# Patient Record
Sex: FEMALE | Race: WHITE | NOT HISPANIC OR LATINO | Employment: UNEMPLOYED | URBAN - METROPOLITAN AREA
[De-identification: names, ages, dates, MRNs, and addresses within clinical notes are randomized per-mention and may not be internally consistent; named-entity substitution may affect disease eponyms.]

---

## 2024-08-29 ENCOUNTER — INITIAL PRENATAL (OUTPATIENT)
Dept: OBSTETRICS AND GYNECOLOGY | Facility: CLINIC | Age: 29
End: 2024-08-29
Payer: COMMERCIAL

## 2024-08-29 VITALS — BODY MASS INDEX: 20.83 KG/M2 | WEIGHT: 133 LBS | SYSTOLIC BLOOD PRESSURE: 110 MMHG | DIASTOLIC BLOOD PRESSURE: 76 MMHG

## 2024-08-29 DIAGNOSIS — Z34.01 ENCOUNTER FOR SUPERVISION OF NORMAL FIRST PREGNANCY IN FIRST TRIMESTER: Primary | ICD-10-CM

## 2024-08-29 LAB — TSH SERPL DL<=0.005 MIU/L-ACNC: 0.21 UIU/ML (ref 0.27–4.2)

## 2024-08-29 RX ORDER — PRENATAL VIT NO.126/IRON/FOLIC 28MG-0.8MG
TABLET ORAL DAILY
COMMUNITY

## 2024-08-29 NOTE — PROGRESS NOTES
Initial ob visit     CC- Here for care of pregnancy        Debbie Stapleton is a 29 y.o. female, , who presents for her first obstetrical visit.  Patient's last menstrual period was 2024.. Her YENI is 2025, by Ultrasound. Current GA is 8w1d.     Initial positive test date : 2024, UPT        Her periods are irregular. States did not have a period x3.5 years. Periods returned after stepping away from her job.   Prior obstetric issues: none  Patient's past medical history is significant for:  thyroid disease, herniated lumbar disc, hx of cervical disc surgery .  Family history of genetic issues (includes FOB): none  Prior infections concerning in pregnancy (Rash, fever in last 2 weeks): No  Varicella Hx - unknown   Prior testing for Cystic Fibrosis Carrier or Sickle Cell Trait- no  Prepregnancy BMI - Body mass index is 20.83 kg/m².  History of STD: no  Hx of HSV for patient or partner: no  Ultrasound Today: yes    OB History    Para Term  AB Living   1             SAB IAB Ectopic Molar Multiple Live Births                    # Outcome Date GA Lbr Chung/2nd Weight Sex Type Anes PTL Lv   1 Current                Additional Pertinent History   Last Pap : 2024 Result:  abnormal per patient-states hx of colposcopy     Last Completed Pap Smear       This patient has no relevant Health Maintenance data.          History of abnormal Pap smear: yes - colpo last year  Family history of uterine, colon, breast, or ovarian cancer: no  Feelings of Anxiety or Depression: no  Tobacco Usage?: No   Alcohol/Drug Use?: NO  Over the age of 35 at delivery: no  Genetic Screening: desires to discuss in the future    PMH    Current Outpatient Medications:     prenatal vitamin (prenatal, CLASSIC, vitamin) tablet, Take  by mouth Daily., Disp: , Rfl:      Past Medical History:   Diagnosis Date    Abnormal Pap smear of cervix     Hypertension     situational with stress    Lumbar herniated disc     Thyroid  disease     Thyroid disorder         Past Surgical History:   Procedure Laterality Date    CERVICAL DISC ARTHROPLASTY  01/15/2024    COLONOSCOPY      COLPOSCOPY      UPPER GASTROINTESTINAL ENDOSCOPY         Review of Systems   Review of Systems    Patient Reports:  no complaints  Patient Denies:excessive nausea , excessive vomiting, and vaginal bleeding  All systems reviewed and otherwise normal.    I have reviewed and agree with the HPI, ROS, and historical information as entered above. Marc Aguilera MD      /76   Wt 60.3 kg (133 lb)   LMP 06/21/2024   BMI 20.83 kg/m²     The additional following portions of the patient's history were reviewed and updated as appropriate: allergies, current medications, past family history, past medical history, past social history, and past surgical history.    Physical Exam  General:  well developed; well nourished  no acute distress   Chest/Respiratory: No labored breathing, normal respiratory effort, normal appearance, no respiratory noises noted   Heart:  normal rate, regular rhythm,  no murmurs, rubs, or gallops   Thyroid: normal to inspection and palpation   Breasts:  Not performed.   Abdomen: soft, non-tender; no masses  no umbilical or inguinal hernias are present  no hepato-splenomegaly   Pelvis: Uterus:  symmetrically enlarged, consisent with 8 weeks size;        Assessment and Plan    Problem List Items Addressed This Visit    None  Visit Diagnoses       Encounter for supervision of normal first pregnancy in first trimester    -  Primary    Relevant Orders    Obstetric Panel    HIV-1 / O / 2 Ag / Antibody    Urine Culture - Urine, Urine, Clean Catch    TSH    Varicella Zoster Antibody, IgG    Chlamydia trachomatis, Neisseria gonorrhoeae, PCR - Urine, Urine, Clean Catch    Urine Drug Screen - Urine, Clean Catch    [id number] PLUS Core+SCA+ESS - Blood,            Pregnancy at 8w1d  Reviewed routine prenatal care with the office and educational materials  given  Lab(s) Ordered  Discussed options for genetic testing including first trimester nuchal translucency screen, genetic disease carrier testing, quadruple screen, and NIPT  Discontinue the use of all non-medicinal drugs and chemicals  Return in about 1 month (around 9/29/2024).      Marc Aguilera MD  08/29/2024

## 2024-08-30 LAB
ABO GROUP BLD: NORMAL
AMPHETAMINES UR QL SCN: NEGATIVE NG/ML
BARBITURATES UR QL SCN: NEGATIVE NG/ML
BASOPHILS # BLD AUTO: 0 X10E3/UL (ref 0–0.2)
BASOPHILS NFR BLD AUTO: 0 %
BENZODIAZ UR QL SCN: NEGATIVE NG/ML
BLD GP AB SCN SERPL QL: NEGATIVE
BZE UR QL SCN: NEGATIVE NG/ML
CANNABINOIDS UR QL SCN: NEGATIVE NG/ML
CREAT UR-MCNC: 177.2 MG/DL (ref 20–300)
EOSINOPHIL # BLD AUTO: 0 X10E3/UL (ref 0–0.4)
EOSINOPHIL NFR BLD AUTO: 0 %
ERYTHROCYTE [DISTWIDTH] IN BLOOD BY AUTOMATED COUNT: 12 % (ref 11.7–15.4)
HBV SURFACE AG SERPL QL IA: NEGATIVE
HCT VFR BLD AUTO: 43.3 % (ref 34–46.6)
HCV IGG SERPL QL IA: NON REACTIVE
HGB BLD-MCNC: 14 G/DL (ref 11.1–15.9)
HIV 1+2 AB+HIV1 P24 AG SERPL QL IA: NON REACTIVE
IMM GRANULOCYTES # BLD AUTO: 0 X10E3/UL (ref 0–0.1)
IMM GRANULOCYTES NFR BLD AUTO: 0 %
LABORATORY COMMENT REPORT: NORMAL
LYMPHOCYTES # BLD AUTO: 2 X10E3/UL (ref 0.7–3.1)
LYMPHOCYTES NFR BLD AUTO: 21 %
MCH RBC QN AUTO: 30.8 PG (ref 26.6–33)
MCHC RBC AUTO-ENTMCNC: 32.3 G/DL (ref 31.5–35.7)
MCV RBC AUTO: 95 FL (ref 79–97)
METHADONE UR QL SCN: NEGATIVE NG/ML
MONOCYTES # BLD AUTO: 0.6 X10E3/UL (ref 0.1–0.9)
MONOCYTES NFR BLD AUTO: 6 %
NEUTROPHILS # BLD AUTO: 7 X10E3/UL (ref 1.4–7)
NEUTROPHILS NFR BLD AUTO: 73 %
OPIATES UR QL SCN: NEGATIVE NG/ML
OXYCODONE+OXYMORPHONE UR QL SCN: NEGATIVE NG/ML
PCP UR QL: NEGATIVE NG/ML
PH UR: 6.6 [PH] (ref 4.5–8.9)
PLATELET # BLD AUTO: 241 X10E3/UL (ref 150–450)
PROPOXYPH UR QL SCN: NEGATIVE NG/ML
RBC # BLD AUTO: 4.55 X10E6/UL (ref 3.77–5.28)
RH BLD: POSITIVE
RPR SER QL: NON REACTIVE
RUBV IGG SERPL IA-ACNC: 5.16 INDEX
VZV IGG SER IA-ACNC: 1047 INDEX
WBC # BLD AUTO: 9.7 X10E3/UL (ref 3.4–10.8)

## 2024-08-31 LAB
C TRACH RRNA SPEC QL NAA+PROBE: NEGATIVE
N GONORRHOEA RRNA SPEC QL NAA+PROBE: NEGATIVE

## 2024-09-01 LAB
BACTERIA UR CULT: NORMAL
BACTERIA UR CULT: NORMAL

## 2024-09-03 LAB
BACTERIA UR CULT: ABNORMAL
BACTERIA UR CULT: ABNORMAL

## 2024-09-04 ENCOUNTER — TELEPHONE (OUTPATIENT)
Dept: OBSTETRICS AND GYNECOLOGY | Facility: CLINIC | Age: 29
End: 2024-09-04
Payer: COMMERCIAL

## 2024-09-04 NOTE — TELEPHONE ENCOUNTER
Patient of Dr. Aguilera; G1 @ 9w 0d.   Returned patient's call.   She asked if she could have NIPS drawn at another lab when she has other labs done. Informed her it would need to be drawn here in our office. She v/u and agreed.

## 2024-09-04 NOTE — TELEPHONE ENCOUNTER
PT is calling because she has discussed getting the genetic testing with [facility] at her last apt and PT is having her labs drawn at different lab and wants to know if she can get them done at the same time.

## 2024-09-05 ENCOUNTER — TELEPHONE (OUTPATIENT)
Dept: OBSTETRICS AND GYNECOLOGY | Facility: CLINIC | Age: 29
End: 2024-09-05
Payer: COMMERCIAL

## 2024-09-05 ENCOUNTER — OFFICE VISIT (OUTPATIENT)
Dept: ENDOCRINOLOGY | Facility: CLINIC | Age: 29
End: 2024-09-05
Payer: COMMERCIAL

## 2024-09-05 VITALS
BODY MASS INDEX: 21.16 KG/M2 | RESPIRATION RATE: 16 BRPM | SYSTOLIC BLOOD PRESSURE: 118 MMHG | HEART RATE: 83 BPM | HEIGHT: 67 IN | WEIGHT: 134.8 LBS | TEMPERATURE: 97.3 F | OXYGEN SATURATION: 99 % | DIASTOLIC BLOOD PRESSURE: 76 MMHG

## 2024-09-05 DIAGNOSIS — E03.9 HYPOTHYROIDISM (ACQUIRED): Primary | ICD-10-CM

## 2024-09-05 PROCEDURE — 99213 OFFICE O/P EST LOW 20 MIN: CPT | Performed by: INTERNAL MEDICINE

## 2024-09-05 RX ORDER — LEVOTHYROXINE SODIUM 125 UG/1
1 TABLET ORAL DAILY
COMMUNITY
Start: 2024-08-31

## 2024-09-05 NOTE — PROGRESS NOTES
"     Office Note      Date: 2024  Patient Name: Debbie Stapleton  MRN: [id number]  : 1995    Chief Complaint   Patient presents with    Hypothyroidism     6mo follow up        History of Present Illness:   Debbie Stapleton is a 29 y.o. female who presents for Hypothyroidism (6mo follow up )    She was on armour thyroid.  She found out she was pregnant at the end of July.  She was changed to levothyroxine 125mcg qd.  She is taking this correctly.  She isn't taking any interfering meds concurrently.  She hasn't noted any change in the size of her neck.  She denies any compressive sxs.  She denies any sxs of hypo- or hyperthyroidism at this time.    She is about 9 weeks pregnant.  She had labs done last week.  The TSH was 0.213 (LLN 0.27)    Subjective      Review of Systems:   Review of Systems   Constitutional: Negative.    Cardiovascular: Negative.    Gastrointestinal: Negative.    Endocrine: Negative.        The following portions of the patient's history were reviewed and updated as appropriate: allergies, current medications, past family history, past medical history, past social history, past surgical history, and problem list.    Objective     Visit Vitals  /76 (BP Location: Left arm, Patient Position: Sitting, Cuff Size: Adult)   Pulse 83   Temp 97.3 °F (36.3 °C) (Infrared)   Resp 16   Ht 170.2 cm (67.01\")   Wt 61.1 kg (134 lb 12.8 oz)   LMP 2024   SpO2 99%   BMI 21.11 kg/m²       Physical Exam:  Physical Exam  Constitutional:       Appearance: Normal appearance.   Neck:      Thyroid: No thyroid mass, thyromegaly or thyroid tenderness.   Lymphadenopathy:      Cervical: No cervical adenopathy.   Neurological:      Mental Status: She is alert.         Labs:    TSH  No results found for: \"TSHBASE\"     Free T4  Free T4   Date Value Ref Range Status   2022 0.97 0.93 - 1.70 ng/dL Final       T3  T3, Total   Date Value Ref Range Status   2021 234.0 (H) 80.0 - 200.0 ng/dl " "Final         TPO  No results found for: \"THYROIDAB\"    TG AB  No results found for: \"THGAB\"    TG  No results found for: \"THYROGLB\"    CBC w/DIFF  Lab Results   Component Value Date    WBC 9.7 08/29/2024    RBC 4.55 08/29/2024    HGB 14.0 08/29/2024    HCT 43.3 08/29/2024    MCV 95 08/29/2024    MCH 30.8 08/29/2024    MCHC 32.3 08/29/2024    RDW 12.0 08/29/2024    RDWSD 40.6 04/18/2022    MPV 10.8 04/18/2022     08/29/2024    NEUTRORELPCT 73 08/29/2024    LYMPHORELPCT 21 08/29/2024    MONORELPCT 6 08/29/2024    EOSRELPCT 0 08/29/2024    BASORELPCT 0 08/29/2024    NEUTROABS 7.0 08/29/2024    LYMPHSABS 2.0 08/29/2024    MONOSABS 0.6 08/29/2024    EOSABS 0.0 08/29/2024    BASOSABS 0.0 08/29/2024           Assessment / Plan      Assessment & Plan:  Diagnoses and all orders for this visit:    1. Hypothyroidism (acquired) (Primary)  Assessment & Plan:  Continue T4 tx.  Recent TSH was mildly low but this is to be expected in the first trimester.  Continue current T4 dose but get labs done again in about 4 weeks.    Orders:  -     TSH; Future      Current Outpatient Medications   Medication Instructions    levothyroxine (SYNTHROID, LEVOTHROID) 125 MCG tablet 1 tablet, Oral, Daily    Magnesium Gluconate (MAGNESIUM 27 PO) Oral    prenatal vitamin (prenatal, CLASSIC, vitamin) tablet Oral, Daily      Return in about 2 months (around 11/5/2024) for Recheck with TSH.    Electronically signed by: Dez Wellington MD  09/05/2024  "

## 2024-09-05 NOTE — TELEPHONE ENCOUNTER
Patient returned called about lab results.   Informed her of positive urine culture for GBS and Dr. Aguilera's recommendation for treatment.   States she has multiple antibiotic allergies and is only able to take IV Vancomycin.   Informed her that I will ask Dr. Aguilera to review and will call her back with his recommendation. She v/u and agreed.   Discussed with Dr. Aguilera. He stated that he would defer treatment as long as patient remains asymptomatic. He does think it would be a good idea for patient to see an allergist for further evaluation of multiple antibiotic allergies. He would suggest Dr. Charity Hodge. PCP can refer or he will if needed.   Informed patient. She v/u and agreed. She will call if she becomes symptomatic. States she no longer has a PCP but doesn't think she needs a referral to schedule with an allergist. She will contact Dr. Hodge's office and will let us know if she needs a referral.

## 2024-09-05 NOTE — ASSESSMENT & PLAN NOTE
Continue T4 tx.  Recent TSH was mildly low but this is to be expected in the first trimester.  Continue current T4 dose but get labs done again in about 4 weeks.

## 2024-09-06 ENCOUNTER — LAB (OUTPATIENT)
Dept: OBSTETRICS AND GYNECOLOGY | Facility: CLINIC | Age: 29
End: 2024-09-06
Payer: COMMERCIAL

## 2024-09-06 DIAGNOSIS — Z34.90 EARLY STAGE OF PREGNANCY: Primary | ICD-10-CM

## 2024-10-01 ENCOUNTER — ROUTINE PRENATAL (OUTPATIENT)
Dept: OBSTETRICS AND GYNECOLOGY | Facility: CLINIC | Age: 29
End: 2024-10-01
Payer: COMMERCIAL

## 2024-10-01 VITALS — WEIGHT: 137 LBS | SYSTOLIC BLOOD PRESSURE: 110 MMHG | DIASTOLIC BLOOD PRESSURE: 68 MMHG | BODY MASS INDEX: 21.45 KG/M2

## 2024-10-01 DIAGNOSIS — O99.281 THYROID DISEASE DURING PREGNANCY IN FIRST TRIMESTER: ICD-10-CM

## 2024-10-01 DIAGNOSIS — E07.9 THYROID DISEASE DURING PREGNANCY IN FIRST TRIMESTER: ICD-10-CM

## 2024-10-01 DIAGNOSIS — Z34.01 PRENATAL CARE, FIRST PREGNANCY, FIRST TRIMESTER: Primary | ICD-10-CM

## 2024-10-01 LAB
GLUCOSE UR STRIP-MCNC: NEGATIVE MG/DL
PROT UR STRIP-MCNC: NEGATIVE MG/DL

## 2024-10-01 PROCEDURE — 0502F SUBSEQUENT PRENATAL CARE: CPT | Performed by: OBSTETRICS & GYNECOLOGY

## 2024-10-01 NOTE — PROGRESS NOTES
OB FOLLOW UP  CC- Here for care of pregnancy        Debbie Stapleton is a 29 y.o.  12w6d patient being seen today for her obstetrical follow up visit. Patient reports no complaints. Patient with + GBS at NOB urine-patient with multiple drug allergies. Discussed plan to treat at time of delivery as patient is asymptomatic at this time. Patient sees Dr. Wellington endocrinology-needs TSH drawn today.     Her prenatal care is complicated by (and status) :   Patient Active Problem List   Diagnosis    Hypothyroidism (acquired)    Palpitations       Genetic testing?: already completed and was normal.  NOB labs reviewed  Ultrasound Today: No    ROS -   Patient Denies: leaking of fluid, vaginal bleeding, and excessive vomiting  All other systems reviewed and are negative.     The additional following portions of the patient's history were reviewed and updated as appropriate: allergies and current medications.    I have reviewed and agree with the HPI, ROS, and historical information as entered above. Marc Aguilera MD          /68   Wt 62.1 kg (137 lb)   LMP 2024   BMI 21.45 kg/m²         EXAM:     Prenatal Vitals  BP: 110/68  Weight: 62.1 kg (137 lb)   Fetal Heart Rate: 156          Urine Glucose Read-only: Negative  Urine Protein Read-only: Negative       Assessment and Plan    Problem List Items Addressed This Visit    None  Visit Diagnoses       Prenatal care, first pregnancy, first trimester    -  Primary    Relevant Orders    POC Urinalysis Dipstick (Completed)    Thyroid disease during pregnancy in first trimester                Pregnancy at 12w6d  Labs reviewed from New OB Visit.  Counseled on genetic testing, carrier status and option for NT screen  Activity and Exercise discussed.  Patient is on Prenatal vitamins  Return in about 1 month (around 2024) for Recheck.    Marc Aguilera MD  10/01/2024

## 2024-10-02 LAB — TSH SERPL DL<=0.005 MIU/L-ACNC: 0.14 UIU/ML (ref 0.45–4.5)

## 2024-10-02 RX ORDER — LEVOTHYROXINE SODIUM 112 UG/1
112 TABLET ORAL DAILY
Qty: 90 TABLET | Refills: 1 | Status: SHIPPED | OUTPATIENT
Start: 2024-10-02

## 2024-10-31 ENCOUNTER — TELEPHONE (OUTPATIENT)
Dept: OBSTETRICS AND GYNECOLOGY | Facility: CLINIC | Age: 29
End: 2024-10-31
Payer: COMMERCIAL

## 2024-10-31 NOTE — TELEPHONE ENCOUNTER
Patient of Dr. Aguilera; G1 @ 17w 1d. LOV 10/01/24.   Returned patient's call.   Discussed the importance of regular prenatal visits. Rescheduled appointment from 11/05/24 to tomorrow. She v/u and agreed.

## 2024-10-31 NOTE — TELEPHONE ENCOUNTER
Pt called and stated she was starting a new job and asked if it would be possible to moved her appointment on 11/5 out to her 20 th week and just do her anatomy scan and everything in 1 visit. 1 told pt I would send message back to see what Dr. Aguilera would prefer.

## 2024-11-01 ENCOUNTER — ROUTINE PRENATAL (OUTPATIENT)
Dept: OBSTETRICS AND GYNECOLOGY | Facility: CLINIC | Age: 29
End: 2024-11-01
Payer: COMMERCIAL

## 2024-11-01 DIAGNOSIS — Z34.90 PRENATAL CARE, ANTEPARTUM: Primary | ICD-10-CM

## 2024-11-01 DIAGNOSIS — Z34.02 ENCOUNTER FOR SUPERVISION OF NORMAL FIRST PREGNANCY IN SECOND TRIMESTER: ICD-10-CM

## 2024-11-01 NOTE — PROGRESS NOTES
OB FOLLOW UP  CC- Here for care of pregnancy        Debbie Stapleton is a 29 y.o.  17w2d patient being seen today for her obstetrical follow up visit. Patient reports no complaints    Her prenatal care is complicated by (and status) : see below.  Patient Active Problem List   Diagnosis    Hypothyroidism (acquired)    Palpitations       Flu Status:   Ultrasound Today: No    AFP: declines    ROS -   Patient Denies: leaking of fluid, vaginal bleeding, dysuria, excessive vomiting, and more than 6 contractions per hour  All other systems reviewed and are negative.       The additional following portions of the patient's history were reviewed and updated as appropriate: allergies, current medications, past family history, past medical history, past social history, past surgical history, and problem list.      I have reviewed and agree with the HPI, ROS, and historical information as entered above. Marc Aguilera MD          EXAM:         Fetal Heart Rate: 150                     Assessment and Plan    Problem List Items Addressed This Visit    None  Visit Diagnoses       Prenatal care, antepartum    -  Primary    Relevant Orders    POC Urinalysis Dipstick    Encounter for supervision of normal first pregnancy in second trimester                Pregnancy at 17w2d  Fetal status reassuring.   Counseled on MSAFP alone in relation to OTD and placental issues.    Anatomy scan next visit.   Activity and Exercise discussed.  U/S ordered at follow up  Patient is on Prenatal vitamins  Return in about 3 weeks (around 2024).    Marc Aguilera MD  2024

## 2024-11-08 ENCOUNTER — ROUTINE PRENATAL (OUTPATIENT)
Dept: OBSTETRICS AND GYNECOLOGY | Facility: CLINIC | Age: 29
End: 2024-11-08
Payer: COMMERCIAL

## 2024-11-08 VITALS — DIASTOLIC BLOOD PRESSURE: 70 MMHG | SYSTOLIC BLOOD PRESSURE: 112 MMHG | BODY MASS INDEX: 22.7 KG/M2 | WEIGHT: 145 LBS

## 2024-11-08 DIAGNOSIS — Z34.90 PRENATAL CARE, ANTEPARTUM: Primary | ICD-10-CM

## 2024-11-08 DIAGNOSIS — Z34.02 FIRST PREGNANCY, SECOND TRIMESTER: ICD-10-CM

## 2024-11-08 LAB
GLUCOSE UR STRIP-MCNC: NEGATIVE MG/DL
PROT UR STRIP-MCNC: NEGATIVE MG/DL

## 2024-11-08 RX ORDER — LEVOTHYROXINE SODIUM 125 UG/1
125 TABLET ORAL DAILY
COMMUNITY
Start: 2024-10-26 | End: 2024-11-11

## 2024-11-08 NOTE — PROGRESS NOTES
OB FOLLOW UP  CC- Here for care of pregnancy        Debbie Stapleton is a 29 y.o.  18w2d patient being seen today for her obstetrical follow up visit. Patient reports pt reports sacral pain that is severe and takes breath away. She reports pain decreases with core exercises. She reports will get pain with changing from sitting to standing position. She reports random heart palpitations that occur with  no exertion.     Her prenatal care is complicated by (and status) : see below.  Patient Active Problem List   Diagnosis    Hypothyroidism (acquired)    Palpitations         Ultrasound Today: No    AFP: declines    ROS -   Patient Denies: leaking of fluid, vaginal bleeding, dysuria, excessive vomiting, and more than 6 contractions per hour  All other systems reviewed and are negative.       The additional following portions of the patient's history were reviewed and updated as appropriate: allergies, current medications, and problem list.      I have reviewed and agree with the HPI, ROS, and historical information as entered above. Marc Aguilera MD          EXAM:     Prenatal Vitals  BP: 112/70  Weight: 65.8 kg (145 lb)   Fetal Heart Rate: 140         Urine Glucose Read-only: Negative  Urine Protein Read-only: Negative           Assessment and Plan    Problem List Items Addressed This Visit    None  Visit Diagnoses       Prenatal care, antepartum    -  Primary    Relevant Orders    POC Urinalysis Dipstick (Completed)    First pregnancy, second trimester        Relevant Orders    POC Urinalysis Dipstick (Completed)    US Ob 14 + Weeks Single or First Gestation            Pregnancy at 18w2d  Fetal status reassuring.   Counseled on MSAFP alone in relation to OTD and placental issues.    Anatomy scan next visit.   Activity and Exercise discussed.  U/S ordered at follow up  Patient is on Prenatal vitamins  Return in about 2 weeks (around 2024) for us .    Marc Aguilera MD  2024

## 2024-11-11 ENCOUNTER — OFFICE VISIT (OUTPATIENT)
Dept: ENDOCRINOLOGY | Facility: CLINIC | Age: 29
End: 2024-11-11
Payer: COMMERCIAL

## 2024-11-11 VITALS
WEIGHT: 147 LBS | DIASTOLIC BLOOD PRESSURE: 64 MMHG | HEIGHT: 67 IN | HEART RATE: 73 BPM | BODY MASS INDEX: 23.07 KG/M2 | OXYGEN SATURATION: 99 % | SYSTOLIC BLOOD PRESSURE: 108 MMHG

## 2024-11-11 DIAGNOSIS — E03.9 HYPOTHYROIDISM (ACQUIRED): Primary | ICD-10-CM

## 2024-11-11 LAB — TSH SERPL DL<=0.05 MIU/L-ACNC: 0.79 UIU/ML (ref 0.27–4.2)

## 2024-11-11 PROCEDURE — 99213 OFFICE O/P EST LOW 20 MIN: CPT | Performed by: INTERNAL MEDICINE

## 2024-11-11 PROCEDURE — 36415 COLL VENOUS BLD VENIPUNCTURE: CPT | Performed by: INTERNAL MEDICINE

## 2024-11-11 PROCEDURE — 84443 ASSAY THYROID STIM HORMONE: CPT | Performed by: INTERNAL MEDICINE

## 2024-11-11 RX ORDER — LEVOTHYROXINE SODIUM 112 UG/1
112 TABLET ORAL DAILY
Start: 2024-11-11

## 2024-11-11 NOTE — PROGRESS NOTES
"     Office Note      Date: 2024  Patient Name: Debbie Stapleton  MRN: [id number]  : 1995    Chief Complaint   Patient presents with    Hypothyroidism       History of Present Illness:   Debbie Stapleton is a 29 y.o. female who presents for Hypothyroidism    She was on armour thyroid. She found out she was pregnant at the end of July. She was changed to levothyroxine 125mcg qd.  Her dose was decreased to 112mcg qd about a month ago. She is taking this correctly. She isn't taking any interfering meds concurrently. She hasn't noted any change in the size of her neck. She denies any compressive sxs. She denies any sxs of hypo- or hyperthyroidism at this time.     She is about 18 weeks into the pregnancy.  It seems to be progressing well.    Subjective      Review of Systems:   Review of Systems   Constitutional: Negative.    Cardiovascular: Negative.    Gastrointestinal: Negative.    Endocrine: Negative.        The following portions of the patient's history were reviewed and updated as appropriate: allergies, current medications, past family history, past medical history, past social history, past surgical history, and problem list.    Objective     Visit Vitals  /64 (BP Location: Left arm, Patient Position: Sitting, Cuff Size: Adult)   Pulse 73   Ht 170.2 cm (67\")   Wt 66.7 kg (147 lb)   LMP 2024   SpO2 99%   BMI 23.02 kg/m²       Physical Exam:  Physical Exam  Constitutional:       Appearance: Normal appearance.   Neck:      Thyroid: No thyroid mass, thyromegaly or thyroid tenderness.   Lymphadenopathy:      Cervical: No cervical adenopathy.   Neurological:      Mental Status: She is alert.         Labs:    TSH  No results found for: \"TSHBASE\"     Free T4  Free T4   Date Value Ref Range Status   2022 0.97 0.93 - 1.70 ng/dL Final       T3  T3, Total   Date Value Ref Range Status   2021 234.0 (H) 80.0 - 200.0 ng/dl Final         TPO  No results found for: \"THYROIDAB\"    TG " "AB  No results found for: \"THGAB\"    TG  No results found for: \"THYROGLB\"    CBC w/DIFF  Lab Results   Component Value Date    WBC 9.7 08/29/2024    RBC 4.55 08/29/2024    HGB 14.0 08/29/2024    HCT 43.3 08/29/2024    MCV 95 08/29/2024    MCH 30.8 08/29/2024    MCHC 32.3 08/29/2024    RDW 12.0 08/29/2024    RDWSD 40.6 04/18/2022    MPV 10.8 04/18/2022     08/29/2024    NEUTRORELPCT 73 08/29/2024    LYMPHORELPCT 21 08/29/2024    MONORELPCT 6 08/29/2024    EOSRELPCT 0 08/29/2024    BASORELPCT 0 08/29/2024    NEUTROABS 7.0 08/29/2024    LYMPHSABS 2.0 08/29/2024    MONOSABS 0.6 08/29/2024    EOSABS 0.0 08/29/2024    BASOSABS 0.0 08/29/2024           Assessment / Plan      Assessment & Plan:  Diagnoses and all orders for this visit:    1. Hypothyroidism (acquired) (Primary)  Assessment & Plan:  Continue T4 tx.  Check TSH today.    Will send note about results.    Orders:  -     TSH; Future    Other orders  -     levothyroxine (SYNTHROID, LEVOTHROID) 112 MCG tablet; Take 1 tablet by mouth Daily.      Current Outpatient Medications   Medication Instructions    levothyroxine (SYNTHROID, LEVOTHROID) 112 mcg, Oral, Daily    Magnesium Gluconate (MAGNESIUM 27 PO) Take  by mouth.    prenatal vitamin (prenatal, CLASSIC, vitamin) tablet Daily      Return in about 2 months (around 1/11/2025) for Recheck with TSH.    Electronically signed by: Dez Wellington MD  11/11/2024  "

## 2024-11-22 ENCOUNTER — ROUTINE PRENATAL (OUTPATIENT)
Dept: OBSTETRICS AND GYNECOLOGY | Facility: CLINIC | Age: 29
End: 2024-11-22
Payer: COMMERCIAL

## 2024-11-22 VITALS — DIASTOLIC BLOOD PRESSURE: 66 MMHG | SYSTOLIC BLOOD PRESSURE: 128 MMHG | WEIGHT: 147 LBS | BODY MASS INDEX: 23.02 KG/M2

## 2024-11-22 DIAGNOSIS — Z34.90 PRENATAL CARE, ANTEPARTUM: Primary | ICD-10-CM

## 2024-11-22 DIAGNOSIS — Z34.92 PRENATAL CARE IN SECOND TRIMESTER: ICD-10-CM

## 2024-11-22 LAB
GLUCOSE UR STRIP-MCNC: NEGATIVE MG/DL
PROT UR STRIP-MCNC: NEGATIVE MG/DL

## 2024-11-22 NOTE — PROGRESS NOTES
OB FOLLOW UP  CC- Here for care of pregnancy        Debbie Stapleton is a 29 y.o.  20w2d patient being seen today for her obstetrical follow up visit. Patient reports no complaints.     Her prenatal care is complicated by (and status) :   Patient Active Problem List   Diagnosis    Hypothyroidism (acquired)    Palpitations       Flu Status: Declines  Ultrasound Today: Yes, anatomy scan  AFP was declined.    ROS -     Patient Denies: leaking of fluid, vaginal bleeding, dysuria, excessive vomiting, and more than 6 contractions per hour  Fetal Movement : Yes  All other systems reviewed and are negative.       The additional following portions of the patient's history were reviewed and updated as appropriate: allergies and current medications.      I have reviewed and agree with the HPI, ROS, and historical information as entered above. Marc Aguilera MD      /66   Wt 66.7 kg (147 lb)   LMP 2024   BMI 23.02 kg/m²       EXAM:     Prenatal Vitals  BP: 128/66  Weight: 66.7 kg (147 lb)   Fetal Heart Rate: 140          Urine Glucose Read-only: Negative  Urine Protein Read-only: Negative       Assessment and Plan    Problem List Items Addressed This Visit    None  Visit Diagnoses       Prenatal care, antepartum    -  Primary    Prenatal care in second trimester        Relevant Orders    POC Urinalysis Dipstick (Completed)            Pregnancy at 20w2d  Anatomy scan today is complete and appear within normal limits.  Fetal status reassuring.   Activity and Exercise discussed.  Patient is on Prenatal vitamins  Return in about 1 month (around 2024).      Marc Aguilera MD  2024

## 2024-12-19 ENCOUNTER — ROUTINE PRENATAL (OUTPATIENT)
Dept: OBSTETRICS AND GYNECOLOGY | Facility: CLINIC | Age: 29
End: 2024-12-19
Payer: COMMERCIAL

## 2024-12-19 VITALS — BODY MASS INDEX: 24.59 KG/M2 | SYSTOLIC BLOOD PRESSURE: 118 MMHG | WEIGHT: 157 LBS | DIASTOLIC BLOOD PRESSURE: 70 MMHG

## 2024-12-19 DIAGNOSIS — Z34.03 ENCOUNTER FOR SUPERVISION OF NORMAL FIRST PREGNANCY IN THIRD TRIMESTER: Primary | ICD-10-CM

## 2024-12-19 NOTE — PROGRESS NOTES
OB FOLLOW UP  CC- Here for care of pregnancy        Debbie Stapleton is a 29 y.o.  24w1d patient being seen today for her obstetrical follow up visit. Patient reports occasional nausea and heartburn.     Her prenatal care is complicated by (and status) : see below.  Patient Active Problem List   Diagnosis    Hypothyroidism (acquired)    Palpitations       Flu Status: Declines  Ultrasound Today: No  Reviewed 1 hr glucose testing and TDAP next visit.    ROS -   Patient Denies: leaking of fluid, vaginal bleeding, dysuria, excessive vomiting, and more than 6 contractions per hour  Fetal Movement : normal  All other systems reviewed and are negative.       The additional following portions of the patient's history were reviewed and updated as appropriate: allergies and current medications.      I have reviewed and agree with the HPI, ROS, and historical information as entered above. Marc Aguilera MD      /70   Wt 71.2 kg (157 lb)   LMP 2024   BMI 24.59 kg/m²       EXAM:     Prenatal Vitals  BP: 118/70  Weight: 71.2 kg (157 lb)   Fetal Heart Rate: 147                       Assessment and Plan    Problem List Items Addressed This Visit    None  Visit Diagnoses       Encounter for supervision of normal first pregnancy in third trimester    -  Primary    Relevant Orders    Antibody Screen    CBC (No Diff)    Gestational Screen 1 Hr ([facility])    RPR Qualitative with Reflex to Quant            Pregnancy at 24w1d  Fetal status reassuring.  No US indicated today.  1 hour gtt, CBC, Antibody screen, TDAP, and RPR next visit. Instructions given  Discussed/encouraged TDAP vaccination after 28 weeks  Activity and Exercise discussed.  No follow-ups on file.      Marc Aguilera MD  2024

## 2025-01-17 ENCOUNTER — ROUTINE PRENATAL (OUTPATIENT)
Dept: OBSTETRICS AND GYNECOLOGY | Facility: CLINIC | Age: 30
End: 2025-01-17
Payer: COMMERCIAL

## 2025-01-17 VITALS — SYSTOLIC BLOOD PRESSURE: 128 MMHG | DIASTOLIC BLOOD PRESSURE: 84 MMHG | WEIGHT: 163 LBS | BODY MASS INDEX: 25.53 KG/M2

## 2025-01-17 DIAGNOSIS — Z34.03 ENCOUNTER FOR SUPERVISION OF NORMAL FIRST PREGNANCY IN THIRD TRIMESTER: Primary | ICD-10-CM

## 2025-01-17 NOTE — PROGRESS NOTES
OB FOLLOW UP  CC- Here for care of pregnancy        Debbie Stapleton is a 29 y.o.  28w2d patient being seen today for her obstetrical follow up. Patient reports heartburn alleviated with tums, and non- pitting edema.     Patient undergoing Glucola testing today. She is due for her testing at 4:50pm .       MBT: A+  Rhogam: is not indicated.  28 week packet: reviewed with patient , counseled on fetal movement , pediatrician list reviewed, breast pump discussed, and childbirth classes reviewed  TDAP:  undecided  Flu Status: Declines  Ultrasound Today: No    Her prenatal care is complicated by (and status) : see below.  Patient Active Problem List   Diagnosis    Hypothyroidism (acquired)    Palpitations         ROS -   Patient Denies: Loss of Fluid, Vaginal Spotting, Vision Changes, Headaches, Nausea , Vomiting , Contractions, and skin itching  Fetal Movement : normal    The additional following portions of the patient's history were reviewed and updated as appropriate: allergies, current medications, past family history, past medical history, past social history, past surgical history, and problem list.    I have reviewed and agree with the HPI, ROS, and historical information as entered above. Marc Aguilera MD      /84   Wt 73.9 kg (163 lb)   LMP 2024   BMI 25.53 kg/m²         EXAM:     Prenatal Vitals  BP: 128/84  Weight: 73.9 kg (163 lb)   Fetal Heart Rate: 145                         Assessment and Plan    Problem List Items Addressed This Visit    None  Visit Diagnoses       Encounter for supervision of normal first pregnancy in third trimester    -  Primary            Pregnancy at 28w2d  1 hr Glucola, CBC, RPR. Antibody screen  Fetal movement/PTL or Labor precautions  Patient is on Prenatal vitamins  Some swelling   Activity and Exercise discussed.  No follow-ups on file.        Marc Aguilera MD  2025

## 2025-01-18 LAB
BLD GP AB SCN SERPL QL: NEGATIVE
ERYTHROCYTE [DISTWIDTH] IN BLOOD BY AUTOMATED COUNT: 12.5 % (ref 11.7–15.4)
GLUCOSE 1H P 50 G GLC PO SERPL-MCNC: 148 MG/DL (ref 70–139)
HCT VFR BLD AUTO: 39.7 % (ref 34–46.6)
HGB BLD-MCNC: 12.8 G/DL (ref 11.1–15.9)
MCH RBC QN AUTO: 31.5 PG (ref 26.6–33)
MCHC RBC AUTO-ENTMCNC: 32.2 G/DL (ref 31.5–35.7)
MCV RBC AUTO: 98 FL (ref 79–97)
PLATELET # BLD AUTO: 218 X10E3/UL (ref 150–450)
RBC # BLD AUTO: 4.06 X10E6/UL (ref 3.77–5.28)
RPR SER QL: NON REACTIVE
TSH SERPL DL<=0.005 MIU/L-ACNC: 0.49 UIU/ML (ref 0.45–4.5)
WBC # BLD AUTO: 16.3 X10E3/UL (ref 3.4–10.8)

## 2025-01-21 DIAGNOSIS — R73.09 ABNORMAL GLUCOSE: Primary | ICD-10-CM

## 2025-01-21 NOTE — PROGRESS NOTES
Order 3hr gtt for failed 1 hour. Also ordered urinalysis due to patient reporting urinary retention.

## 2025-01-23 ENCOUNTER — OFFICE VISIT (OUTPATIENT)
Dept: ENDOCRINOLOGY | Facility: CLINIC | Age: 30
End: 2025-01-23
Payer: COMMERCIAL

## 2025-01-23 VITALS
DIASTOLIC BLOOD PRESSURE: 78 MMHG | OXYGEN SATURATION: 98 % | HEIGHT: 67 IN | SYSTOLIC BLOOD PRESSURE: 130 MMHG | WEIGHT: 165 LBS | HEART RATE: 81 BPM | BODY MASS INDEX: 25.9 KG/M2

## 2025-01-23 DIAGNOSIS — E03.9 HYPOTHYROIDISM (ACQUIRED): Primary | ICD-10-CM

## 2025-01-23 PROCEDURE — 99213 OFFICE O/P EST LOW 20 MIN: CPT | Performed by: INTERNAL MEDICINE

## 2025-01-23 NOTE — ASSESSMENT & PLAN NOTE
Recent TSH at goal at 0.491.  Continue current T4 dose.  Plan to recheck TSH about a month after delivery.

## 2025-01-23 NOTE — PROGRESS NOTES
"     Office Note      Date: 2025  Patient Name: Debbie Stapleton  MRN: [id number]  : 1995    Chief Complaint   Patient presents with    Hypothyroidism       History of Present Illness:   Debbie Stapleton is a 29 y.o. female who presents for Hypothyroidism    She was on armour thyroid. She found out she was pregnant at the end of July. She was changed to levothyroxine 125mcg qd.  Her dose was decreased to 112mcg qd. She is taking this correctly. She isn't taking any interfering meds concurrently. She hasn't noted any change in the size of her neck. She denies any compressive sxs. She denies any sxs of hypo- or hyperthyroidism at this time.      She is about 29 weeks into the pregnancy.  It seems to be progressing well.  She is scheduled for OGTT tomorrow.    Subjective      Review of Systems:   Review of Systems   Constitutional: Negative.    Cardiovascular: Negative.    Gastrointestinal: Negative.    Endocrine: Negative.        The following portions of the patient's history were reviewed and updated as appropriate: allergies, current medications, past family history, past medical history, past social history, past surgical history, and problem list.    Objective     Visit Vitals  /78 (BP Location: Right arm, Patient Position: Sitting, Cuff Size: Adult)   Pulse 81   Ht 170.2 cm (67.01\")   Wt 74.8 kg (165 lb)   LMP 2024   SpO2 98%   BMI 25.84 kg/m²       Physical Exam:  Physical Exam  Constitutional:       Appearance: Normal appearance.   Neck:      Thyroid: No thyroid mass, thyromegaly or thyroid tenderness.   Lymphadenopathy:      Cervical: No cervical adenopathy.   Neurological:      Mental Status: She is alert.         Labs:    TSH  No results found for: \"TSHBASE\"     Free T4  Free T4   Date Value Ref Range Status   2022 0.97 0.93 - 1.70 ng/dL Final       T3  T3, Total   Date Value Ref Range Status   2021 234.0 (H) 80.0 - 200.0 ng/dl Final         TPO  No results " "found for: \"THYROIDAB\"    TG AB  No results found for: \"THGAB\"    TG  No results found for: \"THYROGLB\"    CBC w/DIFF  Lab Results   Component Value Date    WBC 16.3 (H) 01/17/2025    RBC 4.06 01/17/2025    HGB 12.8 01/17/2025    HCT 39.7 01/17/2025    MCV 98 (H) 01/17/2025    MCH 31.5 01/17/2025    MCHC 32.2 01/17/2025    RDW 12.5 01/17/2025    RDWSD 40.6 04/18/2022    MPV 10.8 04/18/2022     01/17/2025    NEUTRORELPCT 73 08/29/2024    LYMPHORELPCT 21 08/29/2024    MONORELPCT 6 08/29/2024    EOSRELPCT 0 08/29/2024    BASORELPCT 0 08/29/2024    NEUTROABS 7.0 08/29/2024    LYMPHSABS 2.0 08/29/2024    MONOSABS 0.6 08/29/2024    EOSABS 0.0 08/29/2024    BASOSABS 0.0 08/29/2024           Assessment / Plan      Assessment & Plan:  Diagnoses and all orders for this visit:    1. Hypothyroidism (acquired) (Primary)  Assessment & Plan:  Recent TSH at goal at 0.491.  Continue current T4 dose.  Plan to recheck TSH about a month after delivery.    Orders:  -     TSH; Future      Current Outpatient Medications   Medication Instructions    levothyroxine (SYNTHROID, LEVOTHROID) 112 mcg, Oral, Daily    Magnesium Gluconate (MAGNESIUM 27 PO) Take  by mouth.    prenatal vitamin (prenatal, CLASSIC, vitamin) tablet Daily      Return in about 6 months (around 7/23/2025) for Recheck with TSH.    Electronically signed by: Dez Wellington MD  01/23/2025  "

## 2025-01-24 ENCOUNTER — LAB (OUTPATIENT)
Dept: OBSTETRICS AND GYNECOLOGY | Facility: CLINIC | Age: 30
End: 2025-01-24
Payer: COMMERCIAL

## 2025-01-24 DIAGNOSIS — O99.810 ABNORMAL GLUCOSE TOLERANCE TEST (GTT) DURING PREGNANCY, ANTEPARTUM: Primary | ICD-10-CM

## 2025-01-24 LAB
GLUCOSE 1H P 100 G GLC PO SERPL-MCNC: 128 MG/DL (ref 65–179)
GLUCOSE 2H P 100 G GLC PO SERPL-MCNC: 114 MG/DL (ref 65–154)
GLUCOSE 3H P 100 G GLC PO SERPL-MCNC: 62 MG/DL (ref 65–139)
GLUCOSE P FAST SERPL-MCNC: 159 MG/DL (ref 65–94)

## 2025-01-28 ENCOUNTER — TELEPHONE (OUTPATIENT)
Dept: OBSTETRICS AND GYNECOLOGY | Facility: CLINIC | Age: 30
End: 2025-01-28
Payer: COMMERCIAL

## 2025-01-28 DIAGNOSIS — O99.810 ABNORMAL GLUCOSE TOLERANCE TEST (GTT) DURING PREGNANCY, ANTEPARTUM: Primary | ICD-10-CM

## 2025-01-28 NOTE — TELEPHONE ENCOUNTER
Returned patient's call.   Informed her that Dr. Aguilera has not reviewed 3 hour OGTT results yet. Someone will contact her with his recommendations/comments when he does.  She states that the person that called her with results of her 1 hour glucose screen told her she needed a urine culture because her WBC count was elevated. She left a specimen when she came in for 3 hour OGTT but does not seen any urine culture results.   Informed her that Dr. Aguilera's result note for previous labs did not mention any recommendation for doing a urine culture and one has not been ordered.   She denies any urinary frequency or dysuria. States she does have intermittent pedal edema that resolves with rest.   Discussed hydrating well; wearing compression hose; using pregnancy support belt; and s/s of preeclampsia. Advised to call for any worsening symptoms. Will ask Dr. Aguilera to review lab results and someone will contact her with his comments/recommendations. She v/u and agreed.

## 2025-01-28 NOTE — TELEPHONE ENCOUNTER
Patient of Dr. Aguilera; G1 @ 29w 6d. LOV 01/17/25; NOV 02/13/25.  Attempted to return patient's call. Left voice message to call us back.

## 2025-01-28 NOTE — TELEPHONE ENCOUNTER
Discussed lab results with Dr. Aguilera.  No need for urine culture.   He recommends patient begin testing glucose at home fasting and 2 hours after meals. Will refer to Diabetes Education and send Rx for testing supplies. Patient sees endocrinology for thyroid disorder; can also discuss with him.   Attempted to reach patient; left voice message to call us back. Need to confirm her preferred pharmacy.

## 2025-01-29 RX ORDER — LANCETS 23 GAUGE
EACH MISCELLANEOUS
Qty: 200 EACH | Refills: 1 | Status: SHIPPED | OUTPATIENT
Start: 2025-01-29

## 2025-01-29 NOTE — TELEPHONE ENCOUNTER
Called patient.   Reviewed Dr. Aguilera's recommendations. Informed her that someone should contact her to schedule Diabetes Education Class; call us back if she has not been contacted in the next few days. RXs for glucose meter and testing supplies will be sent to her pharmacy.   Briefly discussed testing fasting and 2 hours after meals; will be instructed in the class.   Patient v/u and agreed.

## 2025-02-13 ENCOUNTER — ROUTINE PRENATAL (OUTPATIENT)
Dept: OBSTETRICS AND GYNECOLOGY | Facility: CLINIC | Age: 30
End: 2025-02-13
Payer: COMMERCIAL

## 2025-02-13 VITALS — BODY MASS INDEX: 26.27 KG/M2 | WEIGHT: 167.8 LBS | DIASTOLIC BLOOD PRESSURE: 70 MMHG | SYSTOLIC BLOOD PRESSURE: 118 MMHG

## 2025-02-13 DIAGNOSIS — Z3A.32 32 WEEKS GESTATION OF PREGNANCY: ICD-10-CM

## 2025-02-13 DIAGNOSIS — Z34.93 PRENATAL CARE, THIRD TRIMESTER: Primary | ICD-10-CM

## 2025-02-13 LAB
EXPIRATION DATE: 0
GLUCOSE UR STRIP-MCNC: NEGATIVE MG/DL
Lab: 0
PROT UR STRIP-MCNC: NEGATIVE MG/DL

## 2025-02-13 NOTE — PROGRESS NOTES
OB FOLLOW UP  CC- Here for care of pregnancy        Debbie Stapleton is a 29 y.o.  32w1d patient being seen today for her obstetrical follow up visit. Patient reports daily heartburn/reflux (Tums helps), trace BLE/BUE edema, and occasional [place]. Patient reports she just got her blood glucose monitor on yesterday d/t insurance only covering an old monitor type that needed to be special ordered. PP dinner yesterday was 110s, this AM fasting 66, after breakfast 77, and after lunch 158 (lunch was chicken Milanese pasta, grapes, and a garlic roll).    Her prenatal care is complicated by (and status):  see below.  Patient Active Problem List   Diagnosis    Hypothyroidism (acquired)    Palpitations       Flu Status: Declines  TDAP status: declines  Rhogam status: was not indicated  28 week labs: Reviewed. Failed 1hr gtt, and questionable 3hr; diabetes education and fingersticks for glucose monitoring.  Ultrasound Today: No  Non Stress Test: No.      ROS -   Patient Denies: Loss of Fluid, Vaginal Spotting, Vision Changes, Headaches, Nausea , Vomiting , and skin itching  Fetal Movement: normal  Other than what is documented in the HPI, all other systems reviewed and are negative.     The additional following portions of the patient's history were reviewed and updated as appropriate: allergies, current medications, past family history, past medical history, past social history, past surgical history, and problem list.    I have reviewed and agree with the HPI, ROS, and historical information as entered above. Marc Aguilera MD      /70   Wt 76.1 kg (167 lb 12.8 oz)   LMP 2024   BMI 26.27 kg/m²         EXAM:     Prenatal Vitals  BP: 118/70  Weight: 76.1 kg (167 lb 12.8 oz)   Fetal Heart Rate: 145               Urine Glucose Read-only: Negative  Urine Protein Read-only: Negative           Assessment and Plan    Problem List Items Addressed This Visit    None  Visit Diagnoses       Prenatal  care, third trimester    -  Primary    Relevant Orders    POC Glucose, Urine, Qualitative, Dipstick (Completed)    POC Protein, Urine, Qualitative, Dipstick (Completed)    32 weeks gestation of pregnancy        Relevant Orders    POC Glucose, Urine, Qualitative, Dipstick (Completed)    POC Protein, Urine, Qualitative, Dipstick (Completed)            Pregnancy at 32w1d  Fetal status reassuring.  28 week labs reviewed.    Activity and Exercise discussed.  Fetal movement/PTL or Labor precautions  Patient is on Prenatal vitamins  Return in about 2 weeks (around 2/27/2025).    Marc Aguilera MD  02/13/2025

## 2025-02-14 ENCOUNTER — TELEPHONE (OUTPATIENT)
Dept: OBSTETRICS AND GYNECOLOGY | Facility: CLINIC | Age: 30
End: 2025-02-14
Payer: COMMERCIAL

## 2025-02-14 ENCOUNTER — HOSPITAL ENCOUNTER (OUTPATIENT)
Facility: HOSPITAL | Age: 30
Setting detail: OBSERVATION
Discharge: HOME | End: 2025-02-15
Attending: OBSTETRICS & GYNECOLOGY | Admitting: OBSTETRICS & GYNECOLOGY
Payer: COMMERCIAL

## 2025-02-14 PROCEDURE — G0463 HOSPITAL OUTPT CLINIC VISIT: HCPCS

## 2025-02-15 VITALS
DIASTOLIC BLOOD PRESSURE: 75 MMHG | OXYGEN SATURATION: 96 % | BODY MASS INDEX: 26.21 KG/M2 | HEIGHT: 67 IN | RESPIRATION RATE: 18 BRPM | SYSTOLIC BLOOD PRESSURE: 128 MMHG | TEMPERATURE: 98.7 F | WEIGHT: 167 LBS | HEART RATE: 92 BPM

## 2025-02-15 PROBLEM — O36.8130 DECREASED FETAL MOVEMENT AFFECTING MANAGEMENT OF PREGNANCY IN THIRD TRIMESTER, SINGLE OR UNSPECIFIED FETUS: Status: ACTIVE | Noted: 2025-02-15

## 2025-02-15 PROCEDURE — 59025 FETAL NON-STRESS TEST: CPT

## 2025-02-15 NOTE — TELEPHONE ENCOUNTER
"Notified by [facility] of all from Debbie. Called back, she reports \"muted\" fetal movement this evening. She reports her fetus is moving but the movements do not feel as strong nor as frequent as usual for this time of day. Reinforced education regarding fetal kick counts, and she reports she will find something sugary to drink and lay down and count movements. Recommended if <10 movements in 2 hours she should present to labor and delivery for evaluation. Additionally encouraged Debbie she is always welcome to go in for evaluation, even if she does feel the recommended number of movements. Asked about glucose levels and she reports FSBG that are WNLs. She voiced understanding and appreciation.  "

## 2025-02-15 NOTE — H&P
"Debbie Workman Wingo  1995  [id number]  [id number]    CC: decreased fetal movement  HPI:  Patient is 29 y.o. female   currently at 32w3d presents with c/o decreased FM.  Normal FM thru this am.  Since then, baby has been moving, but less frequently and less vigorously.  Pt with occas contractions, no bleeding or leaking.  Baby is moving better here.  PNC comp by hypothyroidism    PMH:  Current meds: PNV, synthroid 112 mcg/d  Illnesses: hypothyroidism  Surgeries: C5-7 disc replacement, oral surg  Allergies: PCN, Amoxicillin, keflex, clindamycin, doxycycline- all cause rash, SOB,    swelling    Past OB History:       OB History    Para Term  AB Living   1 0 0 0 0 0   SAB IAB Ectopic Molar Multiple Live Births   0 0 0 0 0 0      # Outcome Date GA Lbr Chung/2nd Weight Sex Type Anes PTL Lv   1 Current                     SH: tob neg , EtOH neg, drugs neg      General ROS: decreased FM.   All other systems reviewed and are negative.      Physical Examination: General appearance - alert, well appearing, and in no distress  Vital signs - Ht 170.2 cm (67\")   Wt 75.8 kg (167 lb)   LMP 2024   BMI 26.16 kg/m²   HEENT: normocephalic, atraumatic,oropharynx clear, appearance of ears and nose normal  Neck - supple, no significant adenopathy, no thyromegaly  Lymphatics - no palpable lymphadenopathy in the neck or groin, no hepatosplenomegaly  Chest - clear to auscultation, no wheezes, rales or rhonchi, respiratory effort non-labored  Heart - normal rate, regular rhythm, no murmurs, rubs, clicks or gallops, no JVD, no lower extremity edema  Abdomen - soft, nontender, nondistended, no masses, no hepatosplenomegaly  no rebound tenderness noted, bowel sounds normal  Extremities - no pedal edema noted, no calf tenderness  Skin -warm and dry, normal coloration and turgor, no rashes, no suspicious skin lesions noted    Fetal monitoring: indication decreased FM, onset 2328, offset 0010, baseline 130, mod BTB " variability, multiple accels (15 X 15), no decels, occas contractions, interpretation reactive NST    Radiology     Assessment 1)IUP 32 3/7 weeks   2)decreased fetal movement- reassuring NST, baby moving normally now   3)hypothyroidism    Plan 1)observe   2)home   3)keep next sched appt    Derek Alfaro MD  2/15/2025  00:09 EST

## 2025-02-28 ENCOUNTER — ROUTINE PRENATAL (OUTPATIENT)
Dept: OBSTETRICS AND GYNECOLOGY | Facility: CLINIC | Age: 30
End: 2025-02-28
Payer: COMMERCIAL

## 2025-02-28 VITALS — BODY MASS INDEX: 27.1 KG/M2 | SYSTOLIC BLOOD PRESSURE: 132 MMHG | DIASTOLIC BLOOD PRESSURE: 80 MMHG | WEIGHT: 173 LBS

## 2025-02-28 DIAGNOSIS — Z34.90 PRENATAL CARE, ANTEPARTUM: Primary | ICD-10-CM

## 2025-02-28 LAB
GLUCOSE UR STRIP-MCNC: NEGATIVE MG/DL
PROT UR STRIP-MCNC: NEGATIVE MG/DL

## 2025-02-28 NOTE — PROGRESS NOTES
OB FOLLOW UP  CC- Here for care of pregnancy        Debbie Stapleton is a 29 y.o.  34w2d patient being seen today for her obstetrical follow up visit. Patient reports that she has been retaining fluid. She reports that her whole body is swollen when she wakes up. She reports neuropathy in her extremities.  Reports severe acid reflux. Reports itching in hands and feet only at night. Fasting glucose has been 66-85. Two hours postprandial blood glucose level: 70's-150's.    Her prenatal care is complicated by (and status) :   Patient Active Problem List   Diagnosis    Hypothyroidism (acquired)    Palpitations    Decreased fetal movement affecting management of pregnancy in third trimester, single or unspecified fetus       Flu Status: Declines    Ultrasound Today: No  Non Stress Test: No.    ROS -   Patient Denies: Loss of Fluid, Vaginal Spotting, Vision Changes, Headaches, Nausea , Vomiting , Contractions, and skin itching  Fetal Movement : normal  Other than what is documented in the HPI, all other systems reviewed and are negative.       The additional following portions of the patient's history were reviewed and updated as appropriate: allergies, current medications, past family history, past medical history, past social history, past surgical history, and problem list.    I have reviewed and agree with the HPI, ROS, and historical information as entered above. Marc Aguilera MD      /80   Wt 78.5 kg (173 lb)   LMP 2024   BMI 27.10 kg/m²       EXAM:     Prenatal Vitals  BP: 132/80  Weight: 78.5 kg (173 lb)   Fetal Heart Rate: 140               Urine Glucose Read-only: Negative  Urine Protein Read-only: Negative           Assessment and Plan    Problem List Items Addressed This Visit    None  Visit Diagnoses       Prenatal care, antepartum    -  Primary    Relevant Orders    POC Urinalysis Dipstick (Completed)            Pregnancy at 34w2d  Fetal status reassuring.   Activity and  Exercise discussed.  Fetal movement/PTL or Labor precautions  Patient is on Prenatal vitamins  GBS next visit  Return in about 2 weeks (around 3/14/2025).    Marc Aguilera MD  02/28/2025

## 2025-03-27 RX ORDER — LEVOTHYROXINE SODIUM 112 UG/1
112 TABLET ORAL DAILY
Qty: 30 TABLET | Refills: 2
Start: 2025-03-27 | End: 2025-04-26

## 2025-03-27 RX ORDER — LEVOTHYROXINE SODIUM 112 UG/1
112 TABLET ORAL DAILY
Qty: 90 TABLET | Refills: 3 | Status: SHIPPED | OUTPATIENT
Start: 2025-03-27

## 2025-03-27 NOTE — TELEPHONE ENCOUNTER
Rx Refill Note  Requested Prescriptions     Pending Prescriptions Disp Refills    levothyroxine (SYNTHROID, LEVOTHROID) 112 MCG tablet [Pharmacy Med Name: LEVOTHYROXINE 112 MCG TABLET] 90 tablet      Sig: TAKE 1 TABLET BY MOUTH DAILY      Last office visit with prescribing clinician: 1/23/2025     Next office visit with prescribing clinician: 8/12/2025     Randa Julio MA  03/27/25, 13:42 EDT

## 2025-03-27 NOTE — TELEPHONE ENCOUNTER
PT CALLED REQUESTING LEVOTHYROXINE RX TO BE SENT IN TO PHARM IN [place]. [facility] ON [address] IN [place], [place]

## 2025-03-27 NOTE — TELEPHONE ENCOUNTER
Rx Refill Note  Requested Prescriptions     Pending Prescriptions Disp Refills    levothyroxine (SYNTHROID, LEVOTHROID) 112 MCG tablet       Sig: Take 1 tablet by mouth Daily.      Last office visit with prescribing clinician: 1/23/2025   Last telemedicine visit with prescribing clinician: Visit date not found   Next office visit with prescribing clinician: 8/12/2025         Shelly Salazar MA  03/27/25, 15:55 EDT

## 2025-04-01 RX ORDER — LEVOTHYROXINE SODIUM 112 UG/1
112 TABLET ORAL DAILY
Qty: 90 TABLET | Refills: 3 | OUTPATIENT
Start: 2025-04-01

## 2025-04-01 NOTE — TELEPHONE ENCOUNTER
PATIENT IS CALLING BACK STATING SHE IS OUT OF STATE HAVING HER BABY IN [place]. SHE IS COMPLETELY OUT OF HER LEVOTHYROXINE. SHE IS NOT ABLE TO GET THE PRESCRIPTION WE CALLED INTO HER [place] PHARMACY. SHE NEEDS US TO SEND THIS PRESCRIPTION TO   [facility] ON [address] IN [place], [place]    PATIENTS NUMBER [phone]

## 2025-04-02 ENCOUNTER — TELEPHONE (OUTPATIENT)
Dept: ENDOCRINOLOGY | Facility: CLINIC | Age: 30
End: 2025-04-02
Payer: COMMERCIAL

## 2025-04-02 RX ORDER — LEVOTHYROXINE SODIUM 112 UG/1
112 TABLET ORAL DAILY
Qty: 90 TABLET | Refills: 0 | Status: SHIPPED | OUTPATIENT
Start: 2025-04-02

## 2025-04-02 NOTE — TELEPHONE ENCOUNTER
PATIENT CALLED STATING SHE IS IN [place] AND NEEDS HER LEVOTHYROXINE FILLED AT [facility] IN [place], [place].     PER STEFFEN IN OFFICE, MEDICATION IS BEING SENT IN TO THAT PHARMACY.     PATIENT NOTIFIED AND VERBALIZED UNDERSTANDING.

## 2025-05-15 ENCOUNTER — POSTPARTUM VISIT (OUTPATIENT)
Dept: OBSTETRICS AND GYNECOLOGY | Facility: CLINIC | Age: 30
End: 2025-05-15
Payer: COMMERCIAL

## 2025-05-15 VITALS — WEIGHT: 151 LBS | SYSTOLIC BLOOD PRESSURE: 118 MMHG | DIASTOLIC BLOOD PRESSURE: 82 MMHG | BODY MASS INDEX: 23.65 KG/M2

## 2025-05-15 NOTE — PROGRESS NOTES
Chief Complaint   Patient presents with    Postpartum Care       Postpartum Visit         Debbie Stapleton is a 30 y.o.  who presents today for a 6 week(s) postpartum check.  Patient delivered in [place] on 25, vaginal spontaneous.       C/S: no           Baby Discharged: Discharged with Mom  Delivering Physician:  Dr. Orellana at [facility].     Her pregnancy was complicated by no known issues. Patient describes vaginal bleeding as light every day since 25  Patient is breast and bottle feeding.  She desires  condom  for contraception.      She would like to discuss the following complaints today: none    Patient denies concerns for postpartum depression/anxiety. Patient denies  suicidal or homicidal ideation. Her postpartum depression screening questionnaire: 3. No treatment is indicated      Last Pap : per patient over one year.  Last Completed Pap Smear    This patient has no relevant Health Maintenance data.           The additional following portions of the patient's history were reviewed and updated as appropriate: allergies, current medications, and problem list.    Review of Systems  All other systems reviewed and are negative.     I have reviewed and agree with the HPI, ROS, and historical information as entered above. Marc Aguilera MD      /82   Wt 68.5 kg (151 lb)   LMP 2024   Breastfeeding Yes   BMI 23.65 kg/m²     Physical Exam  Vitals and nursing note reviewed. Exam conducted with a chaperone present.   Genitourinary:     Labia:         Right: No rash, tenderness or lesion.         Left: No rash, tenderness or lesion.       Vagina: Lesions present.      Cervix: No cervical motion tenderness, discharge, lesion or cervical bleeding.      Uterus: Normal. Not enlarged, not fixed and not tender.       Adnexa:         Right: No mass or tenderness.          Left: No mass or tenderness.        Rectum: No external hemorrhoid.      Comments: Chaperone Present   Large vaginal hematoma      Large vaginal hematoma R side ,         Assessment and Plan    Problem List Items Addressed This Visit    None  Visit Diagnoses         Postpartum follow-up    -  Primary    Relevant Orders    LIQUID-BASED PAP SMEAR WITH HPV GENOTYPING REGARDLESS OF INTERPRETATION (GUERO,COR,MAD)    US Non-ob Transvaginal            S/p Vaginal delivery, 2 month(s) postpartum.  Doing well.    Return to normal physical activity.  No pelvic restrictions.   Breastfeeding going well.  US to see hematoma   Contraception: contraceptive methods: Condoms  No follow-ups on file.     Marc Aguilera MD  05/15/2025

## 2025-05-19 LAB — REF LAB TEST METHOD: NORMAL
